# Patient Record
Sex: MALE | Race: WHITE | Employment: OTHER | URBAN - METROPOLITAN AREA
[De-identification: names, ages, dates, MRNs, and addresses within clinical notes are randomized per-mention and may not be internally consistent; named-entity substitution may affect disease eponyms.]

---

## 2023-01-10 ENCOUNTER — OFFICE VISIT (OUTPATIENT)
Dept: CARDIOLOGY CLINIC | Age: 43
End: 2023-01-10

## 2023-01-10 VITALS
BODY MASS INDEX: 24.73 KG/M2 | SYSTOLIC BLOOD PRESSURE: 128 MMHG | DIASTOLIC BLOOD PRESSURE: 82 MMHG | WEIGHT: 182.6 LBS | HEART RATE: 55 BPM | HEIGHT: 72 IN

## 2023-01-10 DIAGNOSIS — I49.3 PVC (PREMATURE VENTRICULAR CONTRACTION): Primary | ICD-10-CM

## 2023-01-10 PROCEDURE — 93000 ELECTROCARDIOGRAM COMPLETE: CPT | Performed by: INTERNAL MEDICINE

## 2023-01-10 PROCEDURE — 99245 OFF/OP CONSLTJ NEW/EST HI 55: CPT | Performed by: INTERNAL MEDICINE

## 2023-01-10 RX ORDER — METOPROLOL SUCCINATE 25 MG/1
25 TABLET, EXTENDED RELEASE ORAL DAILY
Qty: 30 TABLET | Refills: 3 | Status: SHIPPED | OUTPATIENT
Start: 2023-01-10

## 2023-01-10 NOTE — PROGRESS NOTES
[facility]  [address] [place] [address], [address]  [place] [address]  PHONE: [phone]    SUBJECTIVE: /HPI  Pierre Marino (1980) is a 42 y.o. male seen for a follow up visit regarding the following:   Specialty Problems    None    42 year old male referred from [facility] for PVCs. States he started having palpitations about six days ago mainly with activity. Went to the ED 2 days ago started having palpitation while sitting on couch. Patients he had Afib 2 years prior requiring chemical cardioversion and another episode 10 years ago requiring no intervention. Father and 2 grandparents (one paternal on maternal) have A. fib as well. EKG showed sinus bradycardia with no abnormalities. Denies chest pain, SOB, leg swelling.    Patient's first 2 episodes of atrial fibrillation sound like they were related to either exerting himself or adrenaline neither were alcohol related    He endorses that labs were checked in the [facility] and reported to him as normal.  TSH was checked    He did not get an echocardiogram but did have monitor showing multiple PVCs    Past Medical History, Past Surgical History, Family history, Social History, and Medications were all reviewed with the patient today and updated as necessary.    Allergies   Allergen Reactions    Amoxicillin Itching and Rash     No past medical history on file.  No past surgical history on file.  No family history on file.   Social History     Tobacco Use    Smoking status: Never    Smokeless tobacco: Never   Substance Use Topics    Alcohol use: Not on file     No current outpatient medications on file.    ROS:  Review of Systems - General ROS: negative for - chills, fatigue or fever  Hematological and Lymphatic ROS: negative for - bleeding problems, bruising or jaundice  Respiratory ROS: no cough, shortness of breath, or wheezing  Cardiovascular ROS: no chest pain or dyspnea on exertion. Palpitations  Gastrointestinal ROS: no abdominal pain, change in  bowel habits, or black or bloody stools  Neurological ROS: no TIA or stroke symptoms  All other systems negative. Wt Readings from Last 3 Encounters:   01/10/23 182 lb 9.6 oz (82.8 kg)     Temp Readings from Last 3 Encounters:   No data found for Temp     BP Readings from Last 3 Encounters:   01/10/23 128/82     Pulse Readings from Last 3 Encounters:   01/10/23 55       PHYSICAL EXAM:  /82   Pulse 55   Ht 6' (1.829 m)   Wt 182 lb 9.6 oz (82.8 kg)   BMI 24.77 kg/m²   Physical Examination: General appearance - alert, well appearing, and in no distress  Mental status - alert, oriented to person, place, and time  Eyes - pupils equal and reactive, extraocular eye movements intact  Neck/lymph - supple, no significant adenopathy  Chest/lungs - clear to auscultation, no wheezes, rales or rhonchi, symmetric air entry  Heart/CV - normal rate, regular rhythm, normal S1, S2, no murmurs, rubs, clicks or gallops  Abdomen/GI - soft, nontender, nondistended, no masses or organomegaly  Musculoskeletal - no joint tenderness, deformity or swelling  Extremities - no pedal edema noted  Skin - normal coloration and turgor, no rashes, no suspicious skin lesions noted    EKG: sinus bradycardia. Non-worrisome         Medications reviewed and questions answered    No results found for this or any previous visit (from the past 672 hour(s)). No results found for: CHOL, CHOLPOCT, CHOLX, CHLST, CHOLV, HDL, HDLPOC, HDLC, LDL, LDLC, VLDLC, VLDL, TGLX, TRIGL    ASSESSMENT and PLAN  Problem List Items Addressed This Visit    None  Visit Diagnoses       PVC (premature ventricular contraction)    -  Primary    Relevant Orders    EKG 12 Lead (Completed)           PVC  Wear Holter monitor to check cardiac function. Needs echocardiogram.  Patient endorses that he is still having episodes of PVCs and that there bothering him    Start toprol XL    Get [employer] [facility] mobile to track events.     Continue meds as below  No current outpatient medications on file. Gracie Lawrence  1/10/2023  8:42 AM    Pt is instructed to follow all appropriate cardiac risk factor recommendations and to be compliant with meds and testing. Instructed to follow up appropriately and seek urgent medical care if acute or unstable issues arise. Results of some tests may be viewed thru [address] but this does not substitute for follow up with MD. If follow up is not scheduled pt is instructed to call for follow up    I have personally seen and evaluated the patient and reviewed the students note and agree with the following assessment and plan and findings. I was present for and observed the key components of this note. Any appropriate additions or editing of the information have been done by me.     Viviane Easton MD, [facility]  Cardiology

## 2023-02-03 ENCOUNTER — OFFICE VISIT (OUTPATIENT)
Dept: CARDIOLOGY CLINIC | Age: 43
End: 2023-02-03

## 2023-02-03 VITALS
DIASTOLIC BLOOD PRESSURE: 80 MMHG | HEART RATE: 85 BPM | BODY MASS INDEX: 24.92 KG/M2 | HEIGHT: 72 IN | SYSTOLIC BLOOD PRESSURE: 112 MMHG | WEIGHT: 184 LBS

## 2023-02-03 DIAGNOSIS — I48.0 PAF (PAROXYSMAL ATRIAL FIBRILLATION) (HCC): ICD-10-CM

## 2023-02-03 DIAGNOSIS — I49.3 PVC (PREMATURE VENTRICULAR CONTRACTION): Primary | ICD-10-CM

## 2023-02-03 PROCEDURE — 99214 OFFICE O/P EST MOD 30 MIN: CPT | Performed by: INTERNAL MEDICINE

## 2023-02-03 RX ORDER — METOPROLOL SUCCINATE 25 MG/1
25 TABLET, EXTENDED RELEASE ORAL DAILY
Qty: 90 TABLET | Refills: 3 | Status: SHIPPED | OUTPATIENT
Start: 2023-02-03

## 2023-02-03 NOTE — TELEPHONE ENCOUNTER
Requested Prescriptions     Pending Prescriptions Disp Refills    metoprolol succinate (TOPROL XL) 25 MG extended release tablet 90 tablet 3     Sig: Take 1 tablet by mouth daily

## 2023-02-03 NOTE — TELEPHONE ENCOUNTER
The pt advises that he will need a prescription called in for the following med if Dr. Gwyn Kim wants him to take it:    Med:  Metoprolol    Phar:   [facility] in [place]

## 2023-02-03 NOTE — PROGRESS NOTES
[facility]  [address], [address] [facility], [address] [place] [address]  PHONE: [phone]    SUBJECTIVE: Santa Bloch (1980) is a 40 y.o. male seen for a follow up visit regarding the following:   Specialty Problems    None    40year old male referred from [facility] ED for PVCs. States he started having palpitations about six days ago mainly with activity. Went to the ED 2 days ago started having palpitation while sitting on couch. Patients he had Afib 2 years prior requiring chemical cardioversion and another episode 10 years ago requiring no intervention. Father and 2 grandparents (one paternal on maternal) have A. fib as well. EKG showed sinus bradycardia with no abnormalities. Denies chest pain, SOB, leg swelling. Patient's first 2 episodes of atrial fibrillation sound like they were related to either exerting himself or adrenaline neither were alcohol related    He endorses that labs were checked in the [facility] ER and reported to him as normal.  TSH was checked    He did not get an echocardiogram but did have monitor showing multiple PVCs    2/3/23  PVCs have subsequently stopped. Patient did not fill the Toprol-XL but in any event his ectopy has resolved. Echo was normal with the exception of mildly increased left ventricular wall thickness this is likely due to physical exertion and an active job/lifestyle    Past Medical History, Past Surgical History, Family history, Social History, and Medications were all reviewed with the patient today and updated as necessary. Allergies   Allergen Reactions    Amoxicillin Itching and Rash     No past medical history on file. No past surgical history on file. No family history on file. Social History     Tobacco Use    Smoking status: Never    Smokeless tobacco: Never   Substance Use Topics    Alcohol use: Not on file     No current outpatient medications on file.     ROS:  Review of Systems - General ROS: negative for - chills, fatigue or fever  Hematological and Lymphatic ROS: negative for - bleeding problems, bruising or jaundice  Respiratory ROS: no cough, shortness of breath, or wheezing  Cardiovascular ROS: no chest pain or dyspnea on exertion. Palpitations  Gastrointestinal ROS: no abdominal pain, change in bowel habits, or black or bloody stools  Neurological ROS: no TIA or stroke symptoms  All other systems negative. Wt Readings from Last 3 Encounters:   02/03/23 184 lb (83.5 kg)   01/19/23 182 lb (82.6 kg)   01/10/23 182 lb 9.6 oz (82.8 kg)     Temp Readings from Last 3 Encounters:   No data found for Temp     BP Readings from Last 3 Encounters:   02/03/23 112/80   01/19/23 128/82   01/10/23 128/82     Pulse Readings from Last 3 Encounters:   02/03/23 85   01/10/23 55       PHYSICAL EXAM:  /80   Pulse 85   Ht 6' (1.829 m)   Wt 184 lb (83.5 kg)   BMI 24.95 kg/m²   Physical Examination: General appearance - alert, well appearing, and in no distress  Mental status - alert, oriented to person, place, and time  Eyes - pupils equal and reactive, extraocular eye movements intact  Neck/lymph - supple, no significant adenopathy  Chest/lungs - clear to auscultation, no wheezes, rales or rhonchi, symmetric air entry  Heart/CV - normal rate, regular rhythm, normal S1, S2, no murmurs, rubs, clicks or gallops  Abdomen/GI - soft, nontender, nondistended, no masses or organomegaly  Musculoskeletal - no joint tenderness, deformity or swelling  Extremities - no pedal edema noted  Skin - normal coloration and turgor, no rashes, no suspicious skin lesions noted    EKG: sinus bradycardia.  Non-worrisome         Medications reviewed and questions answered    Recent Results (from the past 672 hour(s))   Transthoracic echocardiogram (TTE) complete with contrast, bubble, strain, and 3D PRN    Collection Time: 01/19/23  9:21 AM   Result Value Ref Range    LV EDV A2C 73 mL    LV EDV A4C 83 mL    LV ESV A2C 29 mL    LV ESV A4C 25 mL    IVSd 0.9 0.6 - 1.0 cm LVIDd 4.4 4.2 - 5.9 cm    LVIDs 2.6 cm    LVOT Peak Velocity 1.1 m/s    LVOT Peak Gradient 5 mmHg    LVPWd 1.1 (A) 0.6 - 1.0 cm    LV E' Lateral Velocity 12 cm/s    LV E' Septal Velocity 10 cm/s    LV Ejection Fraction A2C 60 %    LV Ejection Fraction A4C 70 %    EF BP 65 55 - 100 %    LA Minor Axis 4.6 cm    LA Major Axis 4.8 cm    LA Area 2C 14.1 cm2    LA Area 4C 13.0 cm2    LA Volume 2C 35 18 - 58 mL    LA Volume 4C 27 18 - 58 mL    LA Volume BP 31 18 - 58 mL    LA Diameter 2.9 cm    AV Peak Velocity 1.2 m/s    AV Peak Gradient 6 mmHg    Aortic Root 3.4 cm    MV E Wave Deceleration Time 331.0 ms    MV A Velocity 0.51 m/s    MV E Velocity 0.63 m/s    Est. RA Pressure 3 mmHg    RVIDd 2.9 cm    TR Max Velocity 2.11 m/s    TR Peak Gradient 18 mmHg    Body Surface Area 2.05 m2    Fractional Shortening 2D 41 28 - 44 %    LV ESV Index A4C 12 mL/m2    LV EDV Index A4C 40 mL/m2    LV ESV Index A2C 14 mL/m2    LV EDV Index A2C 36 mL/m2    LVIDd Index 2.15 cm/m2    LVIDs Index 1.27 cm/m2    LV RWT Ratio 0.50     LV Mass 2D 147.8 88 - 224 g    LV Mass 2D Index 72.1 49 - 115 g/m2    MV E/A 1.24     E/E' Ratio (Averaged) 5.78     E/E' Lateral 5.25     E/E' Septal 6.30     LA Volume Index BP 15 (A) 16 - 34 ml/m2    LA Volume Index 2C 17 16 - 34 mL/m2    LA Volume Index 4C 13 (A) 16 - 34 mL/m2    LA Size Index 1.41 cm/m2    LA/AO Root Ratio 0.85     Ao Root Index 1.66 cm/m2    AV Velocity Ratio 0.92     RVSP 21 mmHg    Left Ventricular Ejection Fraction 58     LVEF MODALITY ECHO      No results found for: CHOL, CHOLPOCT, CHOLX, CHLST, CHOLV, HDL, HDLPOC, HDLC, LDL, LDLC, VLDLC, VLDL, TGLX, TRIGL    ASSESSMENT and PLAN  Problem List Items Addressed This Visit    None  Visit Diagnoses       PVC (premature ventricular contraction)    -  Primary    PAF (paroxysmal atrial fibrillation) (HCC)               PVC  Wear Holter monitor to check cardiac function.      Needs echocardiogram.  Patient endorses that he is still having episodes of PVCs and that there bothering him    Start toprol XL    Get [employer] mobile to track events. Continue meds as below  No current outpatient medications on file. Ambar Connolly MD  2/3/2023  12:48 PM    Pt is instructed to follow all appropriate cardiac risk factor recommendations and to be compliant with meds and testing. Instructed to follow up appropriately and seek urgent medical care if acute or unstable issues arise. Results of some tests may be viewed thru [address] but this does not substitute for follow up with MD. If follow up is not scheduled pt is instructed to call for follow up    I have personally seen and evaluated the patient and reviewed the students note and agree with the following assessment and plan and findings. I was present for and observed the key components of this note. Any appropriate additions or editing of the information have been done by me.     Meena Cloud MD, [facility]  Cardiology